# Patient Record
Sex: MALE | Race: WHITE | URBAN - METROPOLITAN AREA
[De-identification: names, ages, dates, MRNs, and addresses within clinical notes are randomized per-mention and may not be internally consistent; named-entity substitution may affect disease eponyms.]

---

## 2020-05-26 ENCOUNTER — HISTORICAL (OUTPATIENT)
Dept: ADMINISTRATIVE | Facility: HOSPITAL | Age: 60
End: 2020-05-26

## 2020-05-26 LAB
ABS NEUT (OLG): 3.03 X10(3)/MCL (ref 2.1–9.2)
AFP-TM SERPL-MCNC: 6.68 NG/ML
ALBUMIN SERPL-MCNC: 3 GM/DL (ref 3.5–5)
ALBUMIN/GLOB SERPL: 0.7 RATIO (ref 1.1–2)
ALP SERPL-CCNC: 144 UNIT/L (ref 40–150)
ALT SERPL-CCNC: 25 UNIT/L (ref 0–55)
AMPHET UR QL SCN: NEGATIVE
AST SERPL-CCNC: 64 UNIT/L (ref 5–34)
BARBITURATE SCN PRESENT UR: NEGATIVE
BASOPHILS # BLD AUTO: 0.1 X10(3)/MCL (ref 0–0.2)
BASOPHILS NFR BLD AUTO: 1 %
BENZODIAZ UR QL SCN: NEGATIVE
BILIRUB SERPL-MCNC: 1.7 MG/DL
BILIRUBIN DIRECT+TOT PNL SERPL-MCNC: 0.8 MG/DL (ref 0–0.5)
BILIRUBIN DIRECT+TOT PNL SERPL-MCNC: 0.9 MG/DL (ref 0–0.8)
BUN SERPL-MCNC: 4.3 MG/DL (ref 8.4–25.7)
CALCIUM SERPL-MCNC: 8.7 MG/DL (ref 8.4–10.2)
CANNABINOIDS UR QL SCN: NEGATIVE
CHLORIDE SERPL-SCNC: 106 MMOL/L (ref 98–107)
CO2 SERPL-SCNC: 27 MMOL/L (ref 22–29)
COCAINE UR QL SCN: NEGATIVE
CREAT SERPL-MCNC: 0.87 MG/DL (ref 0.73–1.18)
EOSINOPHIL # BLD AUTO: 0.3 X10(3)/MCL (ref 0–0.9)
EOSINOPHIL NFR BLD AUTO: 5 %
ERYTHROCYTE [DISTWIDTH] IN BLOOD BY AUTOMATED COUNT: 13.5 % (ref 11.5–17)
GLOBULIN SER-MCNC: 4.6 GM/DL (ref 2.4–3.5)
GLUCOSE SERPL-MCNC: 90 MG/DL (ref 74–100)
HAV IGM SERPL QL IA: NONREACTIVE
HBV CORE IGM SERPL QL IA: NONREACTIVE
HBV SURFACE AB SER-ACNC: 3.44 M[IU]/ML
HBV SURFACE AB SERPL IA-ACNC: NONREACTIVE M[IU]/ML
HBV SURFACE AG SERPL QL IA: NONREACTIVE
HCT VFR BLD AUTO: 46 % (ref 42–52)
HCV AB SERPL QL IA: REACTIVE
HEPATITIS PANEL INTERP: ABNORMAL
HGB BLD-MCNC: 15.3 GM/DL (ref 14–18)
HIV 1+2 AB+HIV1 P24 AG SERPL QL IA: NONREACTIVE
INR PPP: 1.2 (ref 0–1.3)
LYMPHOCYTES # BLD AUTO: 1.7 X10(3)/MCL (ref 0.6–4.6)
LYMPHOCYTES NFR BLD AUTO: 30 %
MCH RBC QN AUTO: 34.5 PG (ref 27–31)
MCHC RBC AUTO-ENTMCNC: 33.3 GM/DL (ref 33–36)
MCV RBC AUTO: 103.8 FL (ref 80–94)
MONOCYTES # BLD AUTO: 0.7 X10(3)/MCL (ref 0.1–1.3)
MONOCYTES NFR BLD AUTO: 11 %
NEUTROPHILS # BLD AUTO: 3.03 X10(3)/MCL (ref 2.1–9.2)
NEUTROPHILS NFR BLD AUTO: 52 %
OPIATES UR QL SCN: NEGATIVE
PCP UR QL: NEGATIVE
PH UR STRIP.AUTO: 5 [PH] (ref 5–8)
PLATELET # BLD AUTO: 133 X10(3)/MCL (ref 130–400)
PMV BLD AUTO: 12.2 FL (ref 9.4–12.4)
POTASSIUM SERPL-SCNC: 4 MMOL/L (ref 3.5–5.1)
PROT SERPL-MCNC: 7.6 GM/DL (ref 6.4–8.3)
PROTHROMBIN TIME: 14.7 SECOND(S) (ref 11.1–13.7)
RBC # BLD AUTO: 4.43 X10(6)/MCL (ref 4.7–6.1)
SODIUM SERPL-SCNC: 140 MMOL/L (ref 136–145)
SP GR FLD REFRACTOMETRY: 1.02 (ref 1–1.03)
T PALLIDUM AB SER QL: NONREACTIVE
WBC # SPEC AUTO: 5.8 X10(3)/MCL (ref 4.5–11.5)

## 2020-06-02 ENCOUNTER — HISTORICAL (OUTPATIENT)
Dept: RADIOLOGY | Facility: HOSPITAL | Age: 60
End: 2020-06-02

## 2021-09-13 ENCOUNTER — HISTORICAL (OUTPATIENT)
Dept: ADMINISTRATIVE | Facility: HOSPITAL | Age: 61
End: 2021-09-13

## 2021-09-13 LAB
ABS NEUT (OLG): 3.62 X10(3)/MCL (ref 2.1–9.2)
ALBUMIN SERPL-MCNC: 3 GM/DL (ref 3.5–5)
ALBUMIN/GLOB SERPL: 0.5 RATIO (ref 1.1–2)
ALP SERPL-CCNC: 148 UNIT/L (ref 40–150)
ALT SERPL-CCNC: 20 UNIT/L (ref 0–55)
AST SERPL-CCNC: 46 UNIT/L (ref 5–34)
BASOPHILS # BLD AUTO: 0.1 X10(3)/MCL (ref 0–0.2)
BASOPHILS NFR BLD AUTO: 1 %
BILIRUB SERPL-MCNC: 2.4 MG/DL
BILIRUBIN DIRECT+TOT PNL SERPL-MCNC: 1.2 MG/DL (ref 0–0.5)
BILIRUBIN DIRECT+TOT PNL SERPL-MCNC: 1.2 MG/DL (ref 0–0.8)
BUN SERPL-MCNC: 7.7 MG/DL (ref 8.4–25.7)
CALCIUM SERPL-MCNC: 9.2 MG/DL (ref 8.4–10.2)
CHLORIDE SERPL-SCNC: 105 MMOL/L (ref 98–107)
CO2 SERPL-SCNC: 26 MMOL/L (ref 22–29)
CREAT SERPL-MCNC: 1.11 MG/DL (ref 0.73–1.18)
EOSINOPHIL # BLD AUTO: 0.2 X10(3)/MCL (ref 0–0.9)
EOSINOPHIL NFR BLD AUTO: 4 %
ERYTHROCYTE [DISTWIDTH] IN BLOOD BY AUTOMATED COUNT: 13.5 % (ref 11.5–14.5)
FERRITIN SERPL-MCNC: 635.46 NG/ML (ref 21.81–274.66)
GLOBULIN SER-MCNC: 5.7 GM/DL (ref 2.4–3.5)
GLUCOSE SERPL-MCNC: 101 MG/DL (ref 74–100)
HAV AB SER QL IA: REACTIVE
HBV CORE AB SERPL QL IA: REACTIVE
HBV SURFACE AB SER-ACNC: 3.62 MIU/ML
HBV SURFACE AB SERPL IA-ACNC: NONREACTIVE M[IU]/ML
HBV SURFACE AG SERPL QL IA: NONREACTIVE
HCT VFR BLD AUTO: 40.1 % (ref 40–51)
HGB BLD-MCNC: 14.2 GM/DL (ref 13.5–17.5)
HIV 1+2 AB+HIV1 P24 AG SERPL QL IA: NONREACTIVE
IMM GRANULOCYTES # BLD AUTO: 0.02 10*3/UL
IMM GRANULOCYTES NFR BLD AUTO: 0 %
INR PPP: 1.18 (ref 0.9–1.2)
LYMPHOCYTES # BLD AUTO: 1.8 X10(3)/MCL (ref 0.6–4.6)
LYMPHOCYTES NFR BLD AUTO: 28 %
MCH RBC QN AUTO: 36.4 PG (ref 26–34)
MCHC RBC AUTO-ENTMCNC: 35.4 GM/DL (ref 31–37)
MCV RBC AUTO: 102.8 FL (ref 80–100)
MONOCYTES # BLD AUTO: 0.6 X10(3)/MCL (ref 0.1–1.3)
MONOCYTES NFR BLD AUTO: 10 %
NEUTROPHILS # BLD AUTO: 3.62 X10(3)/MCL (ref 2.1–9.2)
NEUTROPHILS NFR BLD AUTO: 57 %
NRBC BLD AUTO-RTO: 0 % (ref 0–0.2)
PLATELET # BLD AUTO: 115 X10(3)/MCL (ref 130–400)
PMV BLD AUTO: 12.4 FL (ref 7.4–10.4)
POTASSIUM SERPL-SCNC: 3.9 MMOL/L (ref 3.5–5.1)
PROT SERPL-MCNC: 8.7 GM/DL (ref 6.4–8.3)
PROTHROMBIN TIME: 14.8 SECOND(S) (ref 11.9–14.4)
RBC # BLD AUTO: 3.9 X10(6)/MCL (ref 4.5–5.9)
SODIUM SERPL-SCNC: 135 MMOL/L (ref 136–145)
T PALLIDUM AB SER QL: NONREACTIVE
WBC # SPEC AUTO: 6.3 X10(3)/MCL (ref 4.5–11)

## 2021-12-01 ENCOUNTER — HISTORICAL (OUTPATIENT)
Dept: ADMINISTRATIVE | Facility: HOSPITAL | Age: 61
End: 2021-12-01

## 2021-12-01 LAB — AMMONIA PLAS-MSCNC: 87.6 UMOL/L (ref 18–72)

## 2022-04-11 ENCOUNTER — HISTORICAL (OUTPATIENT)
Dept: ADMINISTRATIVE | Facility: HOSPITAL | Age: 62
End: 2022-04-11
Payer: MEDICAID

## 2022-04-24 VITALS
WEIGHT: 219.38 LBS | DIASTOLIC BLOOD PRESSURE: 59 MMHG | HEIGHT: 68 IN | SYSTOLIC BLOOD PRESSURE: 94 MMHG | OXYGEN SATURATION: 95 % | BODY MASS INDEX: 33.25 KG/M2

## 2022-05-20 NOTE — HISTORICAL [facility] CERNER
This is a historical note converted from Viri. Formatting and pictures may have been removed.  Please reference Viri for original formatting and attached multimedia. Chief Complaint  hcv referral  History of Present Illness  Eddie is a 60 yo WM presenting today for initial HCV evaluation.? He was diagnosed around 2019 and is treatment naive.? He attended one visit with Dr. Pastrana 5/2020 but did not receive f/u.? He is followed by Dr. CYNDY Ochoa and has been diagnosed with Parkinsons & early Alzheimers disease.?? He is known to have cirrhosis as well, is not currently under the care of GI services for same.? Will process referral.? 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 He has 2 professionally placed tattoos.? No history of blood transfusion, no known HCV + sexual contacts.? He has quit all alcohol use for > 1 year since diagnosed with cirrhosis.? He denies any history of ascites or confusion.? States that he will simply forget things he should know, symptom of his Alzheimers disease.? He resides with long time friend, [place] & she assists him with medical needs as well.? He tells me that he had a seizure a few days ago.? He denies jaundice, icterus, nausea or vomiting, rafi colored stools.? He does recall one episode of dark urine.? Last RUQ abdominal u/s completed 6/2020, no concerning lesions.? Will order for repeat scan near home asap to clear for HCV treatment.? Of note, he also has Hep B c ab +, will treat to minimize risk of HBV reactivation with DAA treatment.? All questions answered & concerns addressed.  ?  5/26/20 (Dr. Blake note)  The patient is a 58 yo male with a history of Hep C, cirrhosis, and tobacco use. He was diagnosed with hep C about a year ago, never treated. He lives with his wife. Has a history of IV heroin abuse?about 20 years ago, currently has a job?and has not done any drugs in a long time,?he used  to also?drink daily about 2-3 drinks however he stopped that?a few months ago when he was told he had cirrhosis,?when he first started having symptoms he had lower extremity swelling and abdominal swelling but he was started on diuretics which improved?this significantly.? He denies any fevers or chills or sweats, he does have a chronic smokers cough?and was told that he had lung damage from smoking before?and COPD.? He denies any?knowledge of any other chronic infections, he was referred here for evaluation and treatment of hepatitis C.  Review of Systems  ?  ?  Constitutional: negative except as stated in HPI  Eye: negative except as stated in HPI  ENMT: negative except as stated in HPI  Respiratory: negative except as stated in HPI  Cardiovascular: negative except as stated in HPI  Gastrointestinal: negative except as stated in HPI  Genitourinary: negative except as stated in HPI  Hema/Lymph: negative except as stated in HPI  Endocrine: negative except as stated in HPI  Immunologic: negative except as stated in HPI  Musculoskeletal: negative except as stated in HPI  Integumentary: negative except as stated in HPI  Neurologic: negative except as stated in HPI  ?   All Other ROS_ ?negative except as stated in HPI  Physical Exam  Vitals & Measurements  T:?36.6? ?C (Oral)? HR:?52(Peripheral)? RR:?14? BP:?94/59?  HT:?172.00?cm? WT:?99.500?kg? BMI:?33.63?  General: AAO X 4, afebrile  Eye:?mild icterus noted today  Respiratory: BBS CTA, non-labored, symmetrical  Cardiovascular: S1S2, RRR  Gastrointestinal BS + 4 quadrants, NTND, soft, no organomegaly to perc/palp, no ascites noted  Musculoskeletal: MAEW, steady gait  Integumentary: mild jaundice noted today  Neurologic: CN II-XII intact  Assessment/Plan  1.?Chronic hepatitis C with cirrhosis?B18.2  treatment naive, GT 2b, decompensated cirrhosis  CP- B, MELD 15 9/13/21; not a candidate for transplant s/t comorbidities  Baseline VL 215401  FibroSure 9/2021 F 4, A  0-1  FibroScan today  Labs today CBC,?CMP, INR, AFP, Ammonia Level?  RUQ abd u/s within 1-2 weeks, scheduled at [facility] 12/7/21  Refer to [facility]?Cirrhosis [facility]  Will refer U/S results & process prescription for Epclusa as indicated  Risks & benefits of HCV treatment discussed at length with pt with ultimate goal of HCV eradication and halting further progression of cirrhosis  Blood precautions: do not share a razor, needle, toothbrush, clippers with anyone  Will set up f/u visit once U/S & Fibroscan results reviewed & addressed  Ordered:  1160F- Medication reconciliation completed during visit, Chronic hepatitis C with cirrhosis  Hepatitis B core antibody positive  Tobacco user, [facility], 12/01/21 10:46:00 CST  Alpha Fetoprotein, Routine collect, 12/01/21 10:49:00 CST, Blood, Stop date 12/01/21 10:49:00 CST, Lab Collect, Chronic hepatitis C with cirrhosis, 12/01/21 10:49:00 CST  Ammonia Level, Routine collect, 12/02/21 5:00:00 CST, Blood, Stop date 12/02/21 5:00:00 CST, Lab Collect, Chronic hepatitis C with cirrhosis, 12/02/21 5:00:00 CST  Clinic Follow up, *Est. 01/26/22 3:00:00 CST, Order for future visit, Chronic hepatitis C with cirrhosis, [facility]  Comprehensive Metabolic Panel, Routine collect, 12/01/21 10:49:00 CST, Blood, Stop date 12/01/21 10:49:00 CST, Lab Collect, Chronic hepatitis C with cirrhosis, 12/01/21 10:49:00 CST  Fibroscan-Vibration Controlled Elastography [id number], 12/01/21 10:45:00 CST, [facility], Routine, 12/01/21 10:45:00 CST, Chronic hepatitis C with cirrhosis  Office/Outpatient Visit Level 4 New [id number] PC, Chronic hepatitis C with cirrhosis  Hepatitis B core antibody positive  Tobacco user, [facility], 12/01/21 10:47:00 CST  PT, Routine collect, 12/01/21 10:49:00 CST, Blood, Stop date 12/01/21 10:49:00 CST, Lab Collect, Chronic hepatitis C with cirrhosis, 12/01/21 10:49:00 CST  US Abdomen Limited, Routine, *Est. 12/01/21 3:00:00 CST, Cirrhosis, liver, None,  Ambulatory, Rad Type, Order for future visit, Chronic hepatitis C with cirrhosis, Outside Facility, *Est. 12/01/21 3:00:00 CST  ?  2.?Hepatitis B core antibody positive?R76.8  ?Will need concomitant treatment with Lamivudine 100mg daily while on?DAA to minimize risk of HBV reactivation  Ordered:  1160F- Medication reconciliation completed during visit, Chronic hepatitis C with cirrhosis  Hepatitis B core antibody positive  Tobacco user, [facility], 12/01/21 10:46:00 CST  Office/Outpatient Visit Level 4 New [id number] PC, Chronic hepatitis C with cirrhosis  Hepatitis B core antibody positive  Tobacco user, [facility], 12/01/21 10:47:00 CST  ?  3.?Tobacco user?Z72.0  ?cessation strongly encouraged  Ordered:  1160F- Medication reconciliation completed during visit, Chronic hepatitis C with cirrhosis  Hepatitis B core antibody positive  Tobacco user, [facility], 12/01/21 10:46:00 CST  Office/Outpatient Visit Level 4 New [id number] PC, Chronic hepatitis C with cirrhosis  Hepatitis B core antibody positive  Tobacco user, [facility], 12/01/21 10:47:00 CST  ?  Orders:  Fibroscan-Vibration Controlled Elastography [id number], 12/01/21 11:05:00 CST, [facility], Routine, 12/01/21 11:05:00 CST, Hepatitis C  Referrals  [facility] Internal Referral to Gastroenterology Clinic, Specialty: Gastroenterology, Start: 12/01/21 10:45:00 CST  Clinic Follow up, *Est. 01/26/22 3:00:00 CST, Order for future visit, Chronic hepatitis C with cirrhosis, [facility]   Problem List/Past Medical History  Ongoing  Cirrhosis of liver  Fibromyalgia syndrome  Hepatitis C  Obesity  Rheumatoid arthritis involving multiple joints  Synovitis of multiple sites  Historical  No qualifying data  Procedure/Surgical History  PET right eat (04.2021)  Tonsillectomy (1964)   Medications  albuterol 0.083% inhalation solution, 2.5 mg= 3 mL, INH, q6hr, PRN  albuterol CFC free 90 mcg/inh inhalation aerosol with adapter, 1 puff(s), Oral, q6hr  budesonide-formoterol 160  mcg-4.5 mcg/inh inhalation aerosol, 2 puff(s), INH, BID  Constulose 10 g/15 mL oral syrup, 10 gm= 15 mL, Oral, Daily  cyproheptadine 4 mg oral tablet, 4 mg= 1 tab(s), Oral, BID  folic acid 1 mg oral tablet, 1 mg= 1 tab(s), Oral, Daily  gabapentin 300 mg oral capsule, 300 mg= 1 cap(s), Oral, BID, 5 refills  hydroxychloroquine 200 mg oral tablet, See Instructions  levetiracetam 750 mg oral tablet, 750 mg= 1 tab(s), Oral, BID  nabumetone 500 mg oral tablet, 500 mg= 1 tab(s), Oral, BID  propranolol 10 mg oral tablet, 10 mg= 1 tab(s), Oral, BID  spironolactone 25 mg oral tablet, 25 mg= 1 tab(s), Oral, Daily  sucralfate 1 g oral tablet, 1 gm= 1 tab(s), Oral, BID  Vitamin B12 2500 mcg sublingual tablet, 2500 mcg= 1 tab(s), SL, Daily, 5 refills  Allergies  tetracycline?(Unknown)  Social History  Abuse/Neglect  No, No, Yes, 12/01/2021  Alcohol  Past, Alcohol use interferes with work or home: No. Others hurt by drinking: No. Household alcohol concerns: No., 05/26/2020  Employment/School  Disabled, Highest education level: High school., 12/01/2021  Exercise  Exercise duration: 0., 12/01/2021  Financial/Legal Situation  Low income, Social Security Disability, 12/01/2021  Home/Environment  Lives with Significant other. Living situation: Home/Independent. Respiratory treatments, Walker/Cane, TV/Computer concerns: No. Mobile home, 12/01/2021    Never in , 12/01/2021  Nutrition/Health  Regular, Fair, 12/01/2021  Sexual  Sexually active: No. Number of current partners 0. Sexual orientation: Straight or heterosexual. Gender Identity Identifies as male. No, 12/01/2021  Spiritual/Cultural  Voodoo, No, 12/01/2021  Substance Use  Never, 05/26/2020  Tobacco  10 or more cigarettes (1/2 pack or more)/day in last 30 days, No, 12/01/2021  Family History  Cardiac arrhythmia.: Sister.  Malignant lymphoma: Sister.  Immunizations  Vaccine Date Status   influenza virus vaccine, inactivated 11/17/2021 Recorded   influenza virus  vaccine, inactivated 11/19/2020 Recorded   Health Maintenance  Health Maintenance  ???Pending?(in the next year)  ??? ??OverDue  ??? ? ? ?Alcohol Misuse Screening due??01/02/21??and every 1??year(s)  ??? ??Due?  ??? ? ? ?Aspirin Therapy for CVD Prevention due??12/01/21??and every 1??year(s)  ??? ? ? ?Colorectal Screening due??12/01/21??Unknown Frequency  ??? ? ? ?Lipid Screening due??12/01/21??Unknown Frequency  ??? ? ? ?Lung Cancer Screening due??12/01/21??and every 1??year(s)  ??? ? ? ?Tetanus Vaccine due??12/01/21??and every 10??year(s)  ??? ? ? ?Zoster Vaccine due??12/01/21??Unknown Frequency  ??? ??Due In Future?  ??? ? ? ?Obesity Screening not due until??01/01/22??and every 1??year(s)  ??? ? ? ?Smoking Cessation not due until??01/01/22??and every 1??year(s)  ???Satisfied?(in the past 1 year)  ??? ??Satisfied?  ??? ? ? ?ADL Screening on??12/01/21.??Satisfied by Charli Killian  ??? ? ? ?Blood Pressure Screening on??12/01/21.??Satisfied by Charli Killian  ??? ? ? ?Body Mass Index Check on??12/01/21.??Satisfied by Charli Killian  ??? ? ? ?Depression Screening on??12/01/21.??Satisfied by Charli Killian  ??? ? ? ?Diabetes Screening on??09/13/21.??Satisfied by Abdi Hickey  ??? ? ? ?Influenza Vaccine on??11/17/21.??Satisfied by Charli Killian  ??? ? ? ?Lipid Screening on??09/13/21.??Satisfied by [facility]  ??? ? ? ?Obesity Screening on??12/01/21.??Satisfied by Charli Killian  ??? ? ? ?Smoking Cessation on??12/01/21.??Satisfied by Charli Killian  ?  Lab Results  Test Name Test Result Date/Time Comments   Creatinine 1.11 mg/dL 09/13/2021 09:52 CDT    Bili Total 2.4 mg/dL (High) 09/13/2021 09:52 CDT    AST 46 unit/L (High) 09/13/2021 09:52 CDT    ALT 20 unit/L 09/13/2021 09:52 CDT    Platelet 115 x10(3)/mcL (Low) 09/13/2021 09:52 CDT    INR 1.18 09/13/2021 09:52 CDT    Syphilis Ab Nonreactive 09/13/2021 09:52 CDT    HIV Nonreactive 09/13/2021 09:52 CDT    Hep A IgG Reactive (Abnormal) 09/13/2021 09:52 CDT    Hep B  Core Ab Reactive (Abnormal) 09/13/2021 09:52 CDT    Hep Bs Ab Nonreactive 09/13/2021 09:52 CDT    Hep Bs Ag Nonreactive 09/13/2021 09:52 CDT    HCV HR Rain-ARUP 2b 09/13/2021 09:52 CDT INTERPRETIVE INFORMATION: Hepatitis C High Resolution  Genotype  Hepatitis C viral RNA is assayed using reverse  transcription polymerase chain reaction (RT-PCR) to amplify  specific portions of both the Core and NS5B regions of the  viral genome. The amplified nucleic acid is sequenced  bi-directionally using dye-terminator chemistry ([employer]).  Sequencing data is compared to a database of characterized  sequences.  ?  Isolates of hepatitis C virus are grouped into six major  genotypes(1-6). These genotypes are subtyped according to  sequence characteristics. Sequencing both the Core and NS5B  regions allows for subtyping of all confirmed and most  provisional genotypes, including differentiation of 1a from  1b and typing of genotype 6.  This test was developed and its performance characteristics  determined by [email or web address]. It has not been cleared or  approved by the US Food and Drug Administration. This test  was performed in a CLIA certified laboratory and is  intended for clinical purposes.  Performed By: [email or web address]  [address] [place] [id number]  : Liz Navarro MD   ANDREE by IFA-LC Negative 09/13/2021 09:52 CDT ? ? ? ? ? ? ? ? ? ? ? ? ? ? ? ? ? ? Negative ? <1:80  ? ? ? ? ? ? ? ? ? ? ? ? ? ? ? ? ? ? Borderline ?1:80  ? ? ? ? ? ? ? ? ? ? ? ? ? ? ? ? ? ? Positive ? >1:80  ICAP nomenclature: AC-0  For more information about Hep-2 cell patterns use  ANApatterns.org, the official website for the  International Consensus on Antinuclear Antibody (ANDREE)  Patterns (ICAP).  Performed At:  [facility] [id number]  Juancarlos JEWELL MD Ph:[phone]   Hep C Qnt-LC [id number] IU/mL 09/13/2021 09:52 CDT    Necroinflam Act Grade-LC A0-A1 09/13/2021 09:52 CDT    Diagnostic  Results  (06/02/2020 10:00 CDT US Abdomen Limited)  * Final Report *  ?  Reason For Exam  b19.20;Hepatitis  ?  Radiology Report  Limited abdominal ultrasound.  ?  HISTORY: Hepatitis C.  ?  FINDINGS: Grayscale imaging and color flow Doppler images are  available for interpretation.  ?  Examination reveals liver to measure approximately 16.4 cm with some  irregularity of its contour there is coarse echotexture with no focal  lesions identified there is no evidence of intrahepatic or  extrahepatic. Ductal dilatation with the common bile duct measuring  approximately 5.5 mm.  ?  Gallbladder is identified free of gallstones or sludge there is no  evidence of gallbladder wall thickening or pericholecystic fluid. Main  portal vein is patent with normal directional flow.  ?  Right kidney subnormal size shape and contour with no abnormal masses  or hydronephrosis. Visualized portions of pancreas appear to be  unremarkable. Inferior vena cava was identified.  ?  IMPRESSION: Coarse echotexture of the liver with scalloped borders.  ?  Otherwise no significant abnormality seen  ?  Signature Line  Electronically Signed By: Jeff Simms MD  Date/Time Signed: 06/02/2020 10:11  ?     [1]?Office Visit Note; Frantz Pastrana MD 05/26/2020 10:16 CDT   Patient is scheduled for abdominal u/s @ [facility] on 12/7/2021 @ 8:00, pt aware